# Patient Record
Sex: FEMALE | Race: WHITE | NOT HISPANIC OR LATINO | URBAN - METROPOLITAN AREA
[De-identification: names, ages, dates, MRNs, and addresses within clinical notes are randomized per-mention and may not be internally consistent; named-entity substitution may affect disease eponyms.]

---

## 2024-11-11 ENCOUNTER — OFFICE VISIT (OUTPATIENT)
Dept: URBAN - METROPOLITAN AREA CLINIC 58 | Facility: CLINIC | Age: 79
End: 2024-11-11
Payer: MEDICARE

## 2024-11-11 DIAGNOSIS — H40.013 OPEN ANGLE WITH BORDERLINE FINDINGS, LOW RISK, BILATERAL: ICD-10-CM

## 2024-11-11 DIAGNOSIS — H52.4 PRESBYOPIA: ICD-10-CM

## 2024-11-11 DIAGNOSIS — H25.13 AGE-RELATED NUCLEAR CATARACT, BILATERAL: Primary | ICD-10-CM

## 2024-11-11 DIAGNOSIS — H35.3131 NONEXUDATIVE MACULAR DEGENERATION, EARLY DRY STAGE, BILATERAL: ICD-10-CM

## 2024-11-11 PROCEDURE — 99203 OFFICE O/P NEW LOW 30 MIN: CPT | Performed by: OPHTHALMOLOGY

## 2024-11-11 PROCEDURE — 92015 DETERMINE REFRACTIVE STATE: CPT | Performed by: OPHTHALMOLOGY

## 2024-11-11 ASSESSMENT — INTRAOCULAR PRESSURE
OD: 16
OS: 16

## 2024-11-11 ASSESSMENT — KERATOMETRY
OS: 44.38
OD: 44.50

## 2024-11-11 ASSESSMENT — VISUAL ACUITY
OS: 20/20
OD: 20/20

## 2025-07-01 ENCOUNTER — APPOINTMENT (OUTPATIENT)
Dept: URBAN - METROPOLITAN AREA CLINIC 145 | Facility: CLINIC | Age: 80
Setting detail: DERMATOLOGY
End: 2025-07-01

## 2025-07-01 DIAGNOSIS — H61.03 CHONDRITIS OF EXTERNAL EAR: ICD-10-CM | Status: STABLE

## 2025-07-01 PROBLEM — H61.031 CHONDRITIS OF RIGHT EXTERNAL EAR: Status: ACTIVE | Noted: 2025-07-01

## 2025-07-01 PROCEDURE — ? TREATMENT REGIMEN

## 2025-07-01 PROCEDURE — ? COUNSELING

## 2025-07-01 ASSESSMENT — LOCATION ZONE DERM: LOCATION ZONE: EAR

## 2025-07-01 ASSESSMENT — LOCATION DETAILED DESCRIPTION DERM: LOCATION DETAILED: RIGHT SUPERIOR HELIX

## 2025-07-01 ASSESSMENT — LOCATION SIMPLE DESCRIPTION DERM: LOCATION SIMPLE: RIGHT EAR

## 2025-07-01 NOTE — PROCEDURE: TREATMENT REGIMEN
Plan to observe patient for 1 month post LN2 treatment from other dermatologist to see if lesion is going to heal. Plan to biopsy in 1 month if the lesion remains persistent. Pt is a side sleeper so we recommended [facility] ear pillow. 
Detail Level: Zone

## 2025-07-01 NOTE — HPI: SKIN LESION
How Severe Is Your Skin Lesion?: mild
Patient has abdominal distress i.e. dyspepsia after taking vitamins.  Is now left with some bloating and belching which seems to be getting better.  Likely this was from irritating her stomach from the vitamins and I have told her that she does not need these other than may be vitamin D since [place].  I have offered her 2 methods of treatment 1 would be omeprazole 20 mg in the morning for approximately 2 weeks.  Or we can do DGL proximately 800 mg before meals and bromelin with meals for approximately 2 weeks.  Either would be a suitable treatment.  She will take 1 omeprazole every morning for 2 weeks and then stop.  Should take away her discomfort if not she will call me.I have spent 30 minutes of face to face time with this patient in which greater than 50% was spent in counseling and coordination of care.The patient and/or guardian has been provided with verbal or written information pertinent to the reason for today's visit, and demonstrates an understanding of what they have received.  
Has Your Skin Lesion Been Treated?: been treated
Is This A New Presentation, Or A Follow-Up?: Skin Lesion
When Was It Treated?: 06/09/2025
Additional History: Pt states that she was treated with LN2 from the other dermatology clinic and the lesion got bigger but is finally subsiding and getting smaller.

## 2025-08-04 ENCOUNTER — APPOINTMENT (OUTPATIENT)
Dept: URBAN - METROPOLITAN AREA CLINIC 145 | Facility: CLINIC | Age: 80
Setting detail: DERMATOLOGY
End: 2025-08-04

## 2025-08-04 DIAGNOSIS — H61.03 CHONDRITIS OF EXTERNAL EAR: ICD-10-CM | Status: RESOLVED

## 2025-08-04 PROBLEM — H61.031 CHONDRITIS OF RIGHT EXTERNAL EAR: Status: ACTIVE | Noted: 2025-08-04

## 2025-08-04 PROCEDURE — ? COUNSELING

## 2025-08-04 PROCEDURE — ? DIAGNOSIS COMMENT

## 2025-08-04 ASSESSMENT — LOCATION ZONE DERM: LOCATION ZONE: EAR

## 2025-08-04 ASSESSMENT — LOCATION SIMPLE DESCRIPTION DERM: LOCATION SIMPLE: RIGHT EAR

## 2025-08-04 ASSESSMENT — LOCATION DETAILED DESCRIPTION DERM: LOCATION DETAILED: RIGHT SUPERIOR HELIX
